# Patient Record
Sex: MALE | Race: WHITE | HISPANIC OR LATINO | Employment: UNEMPLOYED | URBAN - METROPOLITAN AREA
[De-identification: names, ages, dates, MRNs, and addresses within clinical notes are randomized per-mention and may not be internally consistent; named-entity substitution may affect disease eponyms.]

---

## 2018-10-22 ENCOUNTER — OFFICE VISIT (OUTPATIENT)
Dept: URGENT CARE | Facility: CLINIC | Age: 38
End: 2018-10-22

## 2018-10-22 ENCOUNTER — NON-PROVIDER VISIT (OUTPATIENT)
Dept: URGENT CARE | Facility: CLINIC | Age: 38
End: 2018-10-22

## 2018-10-22 DIAGNOSIS — Z01.89 RESPIRATORY CLEARANCE EXAMINATION, ENCOUNTER FOR: ICD-10-CM

## 2018-10-22 PROCEDURE — 94375 RESPIRATORY FLOW VOLUME LOOP: CPT | Performed by: NURSE PRACTITIONER

## 2018-10-22 NOTE — PROGRESS NOTES
Js Blanchard is a 38 y.o. male here for a non-provider visit for mask fit respiratroy clearance    If abnormal was an in office provider notified today (if so, indicate provider)? Yes  Routed to PCP? No

## 2020-11-04 ENCOUNTER — HOSPITAL ENCOUNTER (OUTPATIENT)
Facility: MEDICAL CENTER | Age: 40
End: 2020-11-04
Attending: PHYSICIAN ASSISTANT

## 2020-11-04 ENCOUNTER — OFFICE VISIT (OUTPATIENT)
Dept: URGENT CARE | Facility: PHYSICIAN GROUP | Age: 40
End: 2020-11-04

## 2020-11-04 VITALS
SYSTOLIC BLOOD PRESSURE: 126 MMHG | HEART RATE: 102 BPM | HEIGHT: 69 IN | WEIGHT: 243 LBS | TEMPERATURE: 98.4 F | RESPIRATION RATE: 16 BRPM | BODY MASS INDEX: 35.99 KG/M2 | DIASTOLIC BLOOD PRESSURE: 80 MMHG | OXYGEN SATURATION: 94 %

## 2020-11-04 DIAGNOSIS — J11.1 INFLUENZA-LIKE ILLNESS: ICD-10-CM

## 2020-11-04 PROCEDURE — 99214 OFFICE O/P EST MOD 30 MIN: CPT | Performed by: PHYSICIAN ASSISTANT

## 2020-11-04 PROCEDURE — U0003 INFECTIOUS AGENT DETECTION BY NUCLEIC ACID (DNA OR RNA); SEVERE ACUTE RESPIRATORY SYNDROME CORONAVIRUS 2 (SARS-COV-2) (CORONAVIRUS DISEASE [COVID-19]), AMPLIFIED PROBE TECHNIQUE, MAKING USE OF HIGH THROUGHPUT TECHNOLOGIES AS DESCRIBED BY CMS-2020-01-R: HCPCS

## 2020-11-04 RX ORDER — ALBUTEROL SULFATE 90 UG/1
2 AEROSOL, METERED RESPIRATORY (INHALATION) EVERY 6 HOURS PRN
Qty: 8.5 G | Refills: 0 | Status: SHIPPED | OUTPATIENT
Start: 2020-11-04 | End: 2021-06-14

## 2020-11-04 ASSESSMENT — ENCOUNTER SYMPTOMS
EYE PAIN: 0
PALPITATIONS: 0
FEVER: 1
ABDOMINAL PAIN: 0
HEARTBURN: 0
COUGH: 1
VOMITING: 0
STRIDOR: 0
SORE THROAT: 1
DIARRHEA: 0
NAUSEA: 1
HEADACHES: 0
MYALGIAS: 0
WHEEZING: 0
SENSORY CHANGE: 1
SPUTUM PRODUCTION: 0
DIZZINESS: 0
CHILLS: 0
SHORTNESS OF BREATH: 1
DIAPHORESIS: 0
SINUS PAIN: 0

## 2020-11-05 DIAGNOSIS — J11.1 INFLUENZA-LIKE ILLNESS: ICD-10-CM

## 2020-11-05 NOTE — PROGRESS NOTES
Subjective:   Js Blanchard is a 40 y.o. male who presents for Nausea (loss of taste/smell, fever, SOB, painful inhilation, x6 days )      HPI:  This is a very pleasant 40-year-old male presented to the clinic with fever, nausea, shortness of breath and loss of taste and smell x6 days.  Patient states his fever has gradually improved.  He no longer has any body aches.  He continues to have mild shortness of breath.  Occasional sharp pains with inspiration.  He denies any headaches or dizziness.  He denies any past medical history significant for asthma or COPD.  The patient is a non-smoker.  He is currently not taking any medications.  He has never had a DVT or PE.    Review of Systems   Constitutional: Positive for fever. Negative for chills, diaphoresis and malaise/fatigue.   HENT: Positive for sore throat. Negative for congestion, ear pain and sinus pain.    Eyes: Negative for pain.   Respiratory: Positive for cough and shortness of breath. Negative for sputum production, wheezing and stridor.    Cardiovascular: Negative for chest pain and palpitations.   Gastrointestinal: Positive for nausea. Negative for abdominal pain, diarrhea, heartburn and vomiting.   Musculoskeletal: Negative for myalgias.   Neurological: Positive for sensory change (Loss of taste and smell). Negative for dizziness and headaches.   Endo/Heme/Allergies: Negative for environmental allergies.       Medications:    • albuterol Aers    Allergies: Patient has no known allergies.    Problem List: Js Blanchard does not have a problem list on file.    Surgical History:  No past surgical history on file.    Past Social Hx: Js Blanchard  reports that he has never smoked. He has never used smokeless tobacco. He reports current alcohol use.     Past Family Hx:  Js Blanchard family history is not on file.     Problem list, medications, and allergies reviewed by myself today in Epic.     Objective:     /80 (BP Location: Right arm, Patient Position: Sitting,  "BP Cuff Size: Adult)   Pulse (!) 102   Temp 36.9 °C (98.4 °F) (Temporal)   Resp 16   Ht 1.753 m (5' 9\")   Wt 110.2 kg (243 lb)   SpO2 94%   BMI 35.88 kg/m²     Physical Exam  Constitutional:       General: He is not in acute distress.     Appearance: Normal appearance. He is not ill-appearing, toxic-appearing or diaphoretic.   HENT:      Head: Normocephalic and atraumatic.      Right Ear: Tympanic membrane, ear canal and external ear normal.      Left Ear: Tympanic membrane, ear canal and external ear normal.      Nose: Congestion present. No rhinorrhea.      Mouth/Throat:      Mouth: Mucous membranes are moist.      Pharynx: No oropharyngeal exudate or posterior oropharyngeal erythema.   Eyes:      Conjunctiva/sclera: Conjunctivae normal.   Neck:      Musculoskeletal: Normal range of motion. No neck rigidity or muscular tenderness.   Cardiovascular:      Rate and Rhythm: Normal rate and regular rhythm.      Pulses: Normal pulses.      Heart sounds: Normal heart sounds.   Pulmonary:      Effort: Pulmonary effort is normal.      Breath sounds: Normal breath sounds. No wheezing, rhonchi or rales.   Abdominal:      Palpations: Abdomen is soft.      Tenderness: There is no abdominal tenderness.   Musculoskeletal:      Right lower leg: No edema.      Left lower leg: No edema.   Lymphadenopathy:      Cervical: No cervical adenopathy.   Skin:     General: Skin is warm and dry.      Capillary Refill: Capillary refill takes less than 2 seconds.   Neurological:      General: No focal deficit present.      Mental Status: He is alert and oriented to person, place, and time. Mental status is at baseline.   Psychiatric:         Mood and Affect: Mood normal.         Thought Content: Thought content normal.           Assessment/Plan:     Diagnosis and associated orders:     1. Influenza-like illness  COVID/SARS COV-2 PCR    albuterol 108 (90 Base) MCG/ACT Aero Soln inhalation aerosol      Comments/MDM:       Recommended " strict isolation precautions.  Stay isolated until I reach out with final results.  Results may take 2 to 3 days.  Recommended supportive treatment including increase fluids and rest.  Use Tylenol and ibuprofen as needed for pain and fever.  Red flag symptoms were discussed with the patient at length today.  If any of these symptoms present return to the clinic or nearest emergency department.  Please call with any questions or concerns.  • Discussed other underlying possibilities including PE.  Discussed this work-up.  He would like to be tested for Covid at this time and then discuss further options if symptoms persist.               Differential diagnosis, natural history, supportive care, and indications for immediate follow-up discussed.    Advised the patient to follow-up with the primary care physician for recheck, reevaluation, and consideration of further management.    Please note that this dictation was created using voice recognition software. I have made reasonable attempt to correct obvious errors, but I expect that there are errors of grammar and possibly content that I did not discover before finalizing the note.    This note was electronically signed by SEGUNDO Borges PA-C

## 2020-11-06 LAB
COVID ORDER STATUS COVID19: NORMAL
SARS-COV-2 RNA RESP QL NAA+PROBE: DETECTED
SPECIMEN SOURCE: ABNORMAL

## 2020-11-07 ENCOUNTER — APPOINTMENT (OUTPATIENT)
Dept: RADIOLOGY | Facility: MEDICAL CENTER | Age: 40
End: 2020-11-07

## 2020-11-07 ENCOUNTER — HOSPITAL ENCOUNTER (EMERGENCY)
Facility: MEDICAL CENTER | Age: 40
End: 2020-11-07
Attending: EMERGENCY MEDICINE

## 2020-11-07 ENCOUNTER — APPOINTMENT (OUTPATIENT)
Dept: RADIOLOGY | Facility: MEDICAL CENTER | Age: 40
End: 2020-11-07
Attending: EMERGENCY MEDICINE

## 2020-11-07 VITALS
OXYGEN SATURATION: 100 % | TEMPERATURE: 97.8 F | DIASTOLIC BLOOD PRESSURE: 60 MMHG | HEART RATE: 80 BPM | SYSTOLIC BLOOD PRESSURE: 122 MMHG | RESPIRATION RATE: 16 BRPM | HEIGHT: 69 IN | BODY MASS INDEX: 36.29 KG/M2 | WEIGHT: 245 LBS

## 2020-11-07 DIAGNOSIS — U07.1 PNEUMONIA DUE TO COVID-19 VIRUS: ICD-10-CM

## 2020-11-07 DIAGNOSIS — J12.82 PNEUMONIA DUE TO COVID-19 VIRUS: ICD-10-CM

## 2020-11-07 LAB
ALBUMIN SERPL BCP-MCNC: 3.7 G/DL (ref 3.2–4.9)
ALBUMIN/GLOB SERPL: 1 G/DL
ALP SERPL-CCNC: 57 U/L (ref 30–99)
ALT SERPL-CCNC: 57 U/L (ref 2–50)
ANION GAP SERPL CALC-SCNC: 10 MMOL/L (ref 7–16)
APPEARANCE UR: CLEAR
AST SERPL-CCNC: 39 U/L (ref 12–45)
BACTERIA #/AREA URNS HPF: NEGATIVE /HPF
BASOPHILS # BLD AUTO: 0.2 % (ref 0–1.8)
BASOPHILS # BLD: 0.01 K/UL (ref 0–0.12)
BILIRUB SERPL-MCNC: 0.4 MG/DL (ref 0.1–1.5)
BILIRUB UR QL STRIP.AUTO: NEGATIVE
BUN SERPL-MCNC: 16 MG/DL (ref 8–22)
CALCIUM SERPL-MCNC: 8.9 MG/DL (ref 8.5–10.5)
CHLORIDE SERPL-SCNC: 99 MMOL/L (ref 96–112)
CO2 SERPL-SCNC: 26 MMOL/L (ref 20–33)
COLOR UR: YELLOW
CREAT SERPL-MCNC: 0.93 MG/DL (ref 0.5–1.4)
EOSINOPHIL # BLD AUTO: 0.01 K/UL (ref 0–0.51)
EOSINOPHIL NFR BLD: 0.2 % (ref 0–6.9)
EPI CELLS #/AREA URNS HPF: NEGATIVE /HPF
ERYTHROCYTE [DISTWIDTH] IN BLOOD BY AUTOMATED COUNT: 39.2 FL (ref 35.9–50)
GLOBULIN SER CALC-MCNC: 3.7 G/DL (ref 1.9–3.5)
GLUCOSE SERPL-MCNC: 235 MG/DL (ref 65–99)
GLUCOSE UR STRIP.AUTO-MCNC: >=1000 MG/DL
HCT VFR BLD AUTO: 52 % (ref 42–52)
HGB BLD-MCNC: 18 G/DL (ref 14–18)
HYALINE CASTS #/AREA URNS LPF: ABNORMAL /LPF
IMM GRANULOCYTES # BLD AUTO: 0.03 K/UL (ref 0–0.11)
IMM GRANULOCYTES NFR BLD AUTO: 0.5 % (ref 0–0.9)
KETONES UR STRIP.AUTO-MCNC: 15 MG/DL
LACTATE BLD-SCNC: 1.5 MMOL/L (ref 0.5–2)
LEUKOCYTE ESTERASE UR QL STRIP.AUTO: NEGATIVE
LYMPHOCYTES # BLD AUTO: 1.45 K/UL (ref 1–4.8)
LYMPHOCYTES NFR BLD: 25.3 % (ref 22–41)
MCH RBC QN AUTO: 31 PG (ref 27–33)
MCHC RBC AUTO-ENTMCNC: 34.6 G/DL (ref 33.7–35.3)
MCV RBC AUTO: 89.5 FL (ref 81.4–97.8)
MICRO URNS: ABNORMAL
MONOCYTES # BLD AUTO: 0.41 K/UL (ref 0–0.85)
MONOCYTES NFR BLD AUTO: 7.2 % (ref 0–13.4)
NEUTROPHILS # BLD AUTO: 3.81 K/UL (ref 1.82–7.42)
NEUTROPHILS NFR BLD: 66.6 % (ref 44–72)
NITRITE UR QL STRIP.AUTO: NEGATIVE
NRBC # BLD AUTO: 0 K/UL
NRBC BLD-RTO: 0 /100 WBC
PH UR STRIP.AUTO: 5 [PH] (ref 5–8)
PLATELET # BLD AUTO: 188 K/UL (ref 164–446)
PMV BLD AUTO: 11.2 FL (ref 9–12.9)
POTASSIUM SERPL-SCNC: 4.1 MMOL/L (ref 3.6–5.5)
PROT SERPL-MCNC: 7.4 G/DL (ref 6–8.2)
PROT UR QL STRIP: 100 MG/DL
RBC # BLD AUTO: 5.81 M/UL (ref 4.7–6.1)
RBC # URNS HPF: ABNORMAL /HPF
RBC UR QL AUTO: NEGATIVE
SODIUM SERPL-SCNC: 135 MMOL/L (ref 135–145)
SP GR UR REFRACTOMETRY: 1.04
UROBILINOGEN UR STRIP.AUTO-MCNC: 0.2 MG/DL
WBC # BLD AUTO: 5.7 K/UL (ref 4.8–10.8)
WBC #/AREA URNS HPF: ABNORMAL /HPF

## 2020-11-07 PROCEDURE — 99284 EMERGENCY DEPT VISIT MOD MDM: CPT

## 2020-11-07 PROCEDURE — 81001 URINALYSIS AUTO W/SCOPE: CPT

## 2020-11-07 PROCEDURE — 83605 ASSAY OF LACTIC ACID: CPT

## 2020-11-07 PROCEDURE — 87086 URINE CULTURE/COLONY COUNT: CPT

## 2020-11-07 PROCEDURE — 71045 X-RAY EXAM CHEST 1 VIEW: CPT

## 2020-11-07 PROCEDURE — 80053 COMPREHEN METABOLIC PANEL: CPT

## 2020-11-07 PROCEDURE — 87040 BLOOD CULTURE FOR BACTERIA: CPT | Mod: 91

## 2020-11-07 PROCEDURE — 85025 COMPLETE CBC W/AUTO DIFF WBC: CPT

## 2020-11-07 NOTE — ED TRIAGE NOTES
"Js Blanchard 40 y.o. male triaged in [facility] for     Chief Complaint   Patient presents with   • Cough   • Flank Pain   • Chills   • Fever     temp 101 at home   • Shortness of Breath     Pt reports positive covid test on Wednesday and has been trying to treat at home.  Pt reports he is feeling worse and came because he feels he has protein in his urine and flank pain.  Protocol orders placed.  Charge RN aware of patient.    /94   Pulse (!) 109   Temp 36.2 °C (97.1 °F) (Temporal)   Resp (!) 22   Ht 1.753 m (5' 9\")   Wt 111.1 kg (245 lb)   SpO2 92%   BMI 36.18 kg/m²     "

## 2020-11-07 NOTE — ED PROVIDER NOTES
"ED Provider Note    Scribed for Alec Estrada M.D. by Dannielle Oreilly. 11/7/2020  3:46 PM    Primary care provider: Pcp Pt States None  Means of arrival: Walk-in  History obtained from: Patient  History limited by: None    CHIEF COMPLAINT  Chief Complaint   Patient presents with   • Cough   • Flank Pain   • Chills   • Fever     temp 101 at home   • Shortness of Breath     HPI  Js Blanchard is a 40 y.o. male who presents to the Emergency Department for evaluation of cough, chills, fever, and shortness of breath for the past week. The patient reports positive COVID test 3 days ago and has been self-isolating at home. The patient has had new onset body aches and sediments in his urine which prompted him to come to the ED today. He denies any associated diarrhea.    REVIEW OF SYSTEMS  Pertinent positives include cough, chills, fever, shortness of breath, body aches, and sediments in urine . Pertinent negatives include no diarrhea.  All other systems reviewed and negative.    PAST MEDICAL HISTORY   None noted     SURGICAL HISTORY  patient denies any surgical history    SOCIAL HISTORY  Social History     Tobacco Use   • Smoking status: Never Smoker   • Smokeless tobacco: Never Used   Substance Use Topics   • Alcohol use: Yes   • Drug use: None noted       Social History     Substance and Sexual Activity   Drug Use None noted      FAMILY HISTORY  None noted     CURRENT MEDICATIONS  Home Medications    **Home medications have not yet been reviewed for this encounter**       ALLERGIES  No Known Allergies    PHYSICAL EXAM  VITAL SIGNS: /94   Pulse (!) 109   Temp 36.2 °C (97.1 °F) (Temporal)   Resp (!) 22   Ht 1.753 m (5' 9\")   Wt 111.1 kg (245 lb)   SpO2 92%   BMI 36.18 kg/m²     Vital signs reviewed.  Constitutional:  Well appearing.   Head: Normocephalic.   Mouth/Throat: Oropharynx is clear and moist.   Eyes: EOM are normal. Pupils are equal, round, and reactive to light.   Neck: Normal range of motion. " Neck supple.   Cardiovascular: Normal rate, regular rhythm and normal heart sounds.    Pulmonary/Chest: Effort normal and breath sounds normal. No wheezes.   Abdominal: Soft. There is no tenderness. There is no rebound and no guarding.   Musculoskeletal: Exhibits no edema.   Lymphadenopathy: No cervical adenopathy.   Neurological: Patient is alert and oriented to person, place, and time. CNs II - XII intact. DTRs intact. Normal sensation and strength.  Skin: Skin is warm and dry.   Psychiatric: Patient has a normal mood and affect. Behavior is normal.     LABS  Results for orders placed or performed during the hospital encounter of 11/07/20   Lactic acid (lactate)   Result Value Ref Range    Lactic Acid 1.5 0.5 - 2.0 mmol/L   CBC WITH DIFFERENTIAL   Result Value Ref Range    WBC 5.7 4.8 - 10.8 K/uL    RBC 5.81 4.70 - 6.10 M/uL    Hemoglobin 18.0 14.0 - 18.0 g/dL    Hematocrit 52.0 42.0 - 52.0 %    MCV 89.5 81.4 - 97.8 fL    MCH 31.0 27.0 - 33.0 pg    MCHC 34.6 33.7 - 35.3 g/dL    RDW 39.2 35.9 - 50.0 fL    Platelet Count 188 164 - 446 K/uL    MPV 11.2 9.0 - 12.9 fL    Neutrophils-Polys 66.60 44.00 - 72.00 %    Lymphocytes 25.30 22.00 - 41.00 %    Monocytes 7.20 0.00 - 13.40 %    Eosinophils 0.20 0.00 - 6.90 %    Basophils 0.20 0.00 - 1.80 %    Immature Granulocytes 0.50 0.00 - 0.90 %    Nucleated RBC 0.00 /100 WBC    Neutrophils (Absolute) 3.81 1.82 - 7.42 K/uL    Lymphs (Absolute) 1.45 1.00 - 4.80 K/uL    Monos (Absolute) 0.41 0.00 - 0.85 K/uL    Eos (Absolute) 0.01 0.00 - 0.51 K/uL    Baso (Absolute) 0.01 0.00 - 0.12 K/uL    Immature Granulocytes (abs) 0.03 0.00 - 0.11 K/uL    NRBC (Absolute) 0.00 K/uL   COMP METABOLIC PANEL   Result Value Ref Range    Sodium 135 135 - 145 mmol/L    Potassium 4.1 3.6 - 5.5 mmol/L    Chloride 99 96 - 112 mmol/L    Co2 26 20 - 33 mmol/L    Anion Gap 10.0 7.0 - 16.0    Glucose 235 (H) 65 - 99 mg/dL    Bun 16 8 - 22 mg/dL    Creatinine 0.93 0.50 - 1.40 mg/dL    Calcium 8.9 8.5 - 10.5  mg/dL    AST(SGOT) 39 12 - 45 U/L    ALT(SGPT) 57 (H) 2 - 50 U/L    Alkaline Phosphatase 57 30 - 99 U/L    Total Bilirubin 0.4 0.1 - 1.5 mg/dL    Albumin 3.7 3.2 - 4.9 g/dL    Total Protein 7.4 6.0 - 8.2 g/dL    Globulin 3.7 (H) 1.9 - 3.5 g/dL    A-G Ratio 1.0 g/dL   URINALYSIS    Specimen: Urine   Result Value Ref Range    Color Yellow     Character Clear     Ph 5.0 5.0 - 8.0    Glucose >=1000 (A) Negative mg/dL    Ketones 15 (A) Negative mg/dL    Protein 100 (A) Negative mg/dL    Bilirubin Negative Negative    Urobilinogen, Urine 0.2 Negative    Nitrite Negative Negative    Leukocyte Esterase Negative Negative    Occult Blood Negative Negative    Micro Urine Req Microscopic    ESTIMATED GFR   Result Value Ref Range    GFR If African American >60 >60 mL/min/1.73 m 2    GFR If Non African American >60 >60 mL/min/1.73 m 2   REFRACTOMETER SG   Result Value Ref Range    Specific Gravity 1.040    URINE MICROSCOPIC (W/UA)   Result Value Ref Range    WBC 0-2 (A) /hpf    RBC 0-2 (A) /hpf    Bacteria Negative None /hpf    Epithelial Cells Negative /hpf    Hyaline Cast 0-2 /lpf     All labs reviewed by me.    RADIOLOGY  DX-CHEST-PORTABLE (1 VIEW)   Final Result         Diffuse interstitial prominence with hazy peripheral opacities, in keeping with Covid 19 pneumonia.        The radiologist's interpretation of all radiological studies have been reviewed by me.    COURSE & MEDICAL DECISION MAKING  Pertinent Labs & Imaging studies reviewed. (See chart for details) The patient's [facility] and past medical  records were reviewed    3:46 PM - Patient seen and examined at bedside. Ordered DX-Chest, blood culture, urine culture, UA, CMP, CC with differential, lactic acid, and estimated GFR to evaluate his symptoms. The differential diagnoses include but are not limited to: COVID, pneumonia, sepsis    5:02 PM - I updated on the patient's lab and imaging results. Discussed plans for discharge at this time. I discussed plan of  discharge and strict return precautions for any new or worsening symptoms, including isolation at home. The patient verbalizes agreement and understands.    The patient will return for new or worsening symptoms and is stable at the time of discharge.    The patient is referred to a primary physician for blood pressure management, diabetic screening, and for all other preventative health concerns.    DISPOSITION:  Patient will be discharged home in stable condition.    FOLLOW UP:  [facility], Emergency Dept  [address] [address] [phone]  In 2 days      OUTPATIENT MEDICATIONS:  Discharge Medication List as of 11/7/2020  5:05 PM        FINAL IMPRESSION  1. Pneumonia due to COVID-19 virus          IDannielle (Pattiibe), am scribing for, and in the presence of, Alec Estrada M.D..    Electronically signed by: Dannielle Oreilly ([place]), 11/7/2020    IAlec M.D. personally performed the services described in this documentation, as scribed by Dannielle Oreilly in my presence, and it is both accurate and complete.    C.     The note accurately reflects work and decisions made by me.  Alec Estrada M.D.  11/7/2020  9:00 PM

## 2020-11-10 LAB
BACTERIA UR CULT: NORMAL
SIGNIFICANT IND 70042: NORMAL
SITE SITE: NORMAL
SOURCE SOURCE: NORMAL

## 2020-11-12 LAB
BACTERIA BLD CULT: NORMAL
BACTERIA BLD CULT: NORMAL
SIGNIFICANT IND 70042: NORMAL
SIGNIFICANT IND 70042: NORMAL
SITE SITE: NORMAL
SITE SITE: NORMAL
SOURCE SOURCE: NORMAL
SOURCE SOURCE: NORMAL

## 2021-05-03 ENCOUNTER — TELEPHONE (OUTPATIENT)
Dept: SCHEDULING | Facility: IMAGING CENTER | Age: 41
End: 2021-05-03

## 2021-06-14 ENCOUNTER — OFFICE VISIT (OUTPATIENT)
Dept: MEDICAL GROUP | Facility: MEDICAL CENTER | Age: 41
End: 2021-06-14
Attending: PHYSICIAN ASSISTANT
Payer: MEDICAID

## 2021-06-14 VITALS
OXYGEN SATURATION: 93 % | HEIGHT: 69 IN | RESPIRATION RATE: 16 BRPM | HEART RATE: 95 BPM | WEIGHT: 245.2 LBS | DIASTOLIC BLOOD PRESSURE: 90 MMHG | SYSTOLIC BLOOD PRESSURE: 142 MMHG | TEMPERATURE: 99 F | BODY MASS INDEX: 36.32 KG/M2

## 2021-06-14 DIAGNOSIS — Z86.16 HISTORY OF COVID-19: ICD-10-CM

## 2021-06-14 DIAGNOSIS — R40.0 DAYTIME SOMNOLENCE: ICD-10-CM

## 2021-06-14 DIAGNOSIS — R06.81 APNEIC EPISODE: ICD-10-CM

## 2021-06-14 DIAGNOSIS — R82.998 FOAMY URINE: ICD-10-CM

## 2021-06-14 DIAGNOSIS — R03.0 ELEVATED BLOOD PRESSURE READING: ICD-10-CM

## 2021-06-14 DIAGNOSIS — Z00.00 HEALTH CARE MAINTENANCE: ICD-10-CM

## 2021-06-14 PROCEDURE — 99203 OFFICE O/P NEW LOW 30 MIN: CPT | Performed by: PHYSICIAN ASSISTANT

## 2021-06-14 PROCEDURE — 99204 OFFICE O/P NEW MOD 45 MIN: CPT | Performed by: PHYSICIAN ASSISTANT

## 2021-06-14 PROCEDURE — 99213 OFFICE O/P EST LOW 20 MIN: CPT | Performed by: PHYSICIAN ASSISTANT

## 2021-06-14 RX ORDER — BLOOD PRESSURE TEST KIT
1 KIT MISCELLANEOUS ONCE
Qty: 1 KIT | Refills: 0 | Status: SHIPPED
Start: 2021-06-14 | End: 2021-06-14

## 2021-06-14 ASSESSMENT — PATIENT HEALTH QUESTIONNAIRE - PHQ9: CLINICAL INTERPRETATION OF PHQ2 SCORE: 0

## 2021-06-14 ASSESSMENT — FIBROSIS 4 INDEX: FIB4 SCORE: 1.18

## 2021-06-14 NOTE — ASSESSMENT & PLAN NOTE
Js presents today to establish care.  He reports that he had COVID-19 pneumonia back in November 2020.  He reports that since then he has had residual chest pain and shortness of breath.  He states that this has gotten better over time but has been noticeable.  He also reports lower back pain in which she is concerned for kidney issues.  Last labs were completed back in November.  He has not had any follow-up health maintenance labs completed.  No red flag signs on exam.  Patient appears well.

## 2021-06-14 NOTE — ASSESSMENT & PLAN NOTE
"She presents today to establish care.  He reports that shortly after recovering from COVID-19 infection back in November 2020 he started noticing \"foamy urine\".  He also reports associated symptoms of polyuria, polydipsia and delayed wound healing.  He reports a significant family history of type 2 diabetes in his mother and brother.  He would like to be screened for this.  "

## 2021-06-14 NOTE — ASSESSMENT & PLAN NOTE
"Js presents today to establish care.  He reports daytime symptoms of EDS or fatigue, non-restorative sleep upon waking and sore/dry throat. Nighttime symptoms: loud snoring, snort/gasp that arouses pt from sleep but not usually to full consciousness. Disrupted sleep and witnessed apneic episodes at night.  He also reports a feeling of his \"heart racing\" in the mornings upon waking.  He is not currently on any medications.  Denies taking OTC sedatives.  Risk factors: Obesity, >41 yo and alcohol intake before bedtime.       "

## 2021-06-14 NOTE — PROGRESS NOTES
"Chief Complaint   Patient presents with   • Establish Care     Subjective:     HPI:   Js Blanchard is a 41 y.o. male here to establish care and to discuss the evaluation and management of:    Foamy urine  Js presents today to establish care.  He reports that shortly after recovering from COVID-19 infection back in November 2020 he started noticing \"foamy urine\".  He also reports associated symptoms of polyuria, polydipsia and delayed wound healing.  He reports a significant family history of type 2 diabetes in his mother and brother.  He would like to be screened for this.    Apneic episode  Js presents today to establish care.  He reports daytime symptoms of EDS or fatigue, non-restorative sleep upon waking and sore/dry throat. Nighttime symptoms: loud snoring, snort/gasp that arouses pt from sleep but not usually to full consciousness. Disrupted sleep and witnessed apneic episodes at night.  He also reports a feeling of his \"heart racing\" in the mornings upon waking.  He is not currently on any medications.  Denies taking OTC sedatives.  Risk factors: Obesity, >39 yo and alcohol intake before bedtime.     History of COVID-19  Js presents today to establish care.  He reports that he had COVID-19 pneumonia back in November 2020.  He reports that since then he has had residual chest pain and shortness of breath.  He states that this has gotten better over time but has been noticeable.  He also reports lower back pain in which she is concerned for kidney issues.  Last labs were completed back in November.  He has not had any follow-up health maintenance labs completed.  No red flag signs on exam.  Patient appears well.    ROS  See HPI.     No Known Allergies    Current medicines (including changes today)  Current Outpatient Medications   Medication Sig Dispense Refill   • Blood Pressure Kit 1 Each one time for 1 dose. 1 Kit 0     No current facility-administered medications for this visit.     He  has a past " "medical history of Hyperlipidemia and Hypertension.  He  has no past surgical history on file.  Social History     Tobacco Use   • Smoking status: Never Smoker   • Smokeless tobacco: Never Used   Vaping Use   • Vaping Use: Never used   Substance Use Topics   • Alcohol use: Yes     Comment: 2-3 drinks a week.   • Drug use: Never       Family History   Problem Relation Age of Onset   • Diabetes Mother    • Hypertension Mother    • Hyperlipidemia Mother    • Diabetes Brother    • Hypertension Brother    • Hyperlipidemia Brother    • Lung Disease Neg Hx    • Cancer Neg Hx    • Heart Disease Neg Hx      No family status information on file.       Patient Active Problem List    Diagnosis Date Noted   • Foamy urine 06/14/2021   • Elevated blood pressure reading 06/14/2021   • Apneic episode 06/14/2021   • History of COVID-19 06/14/2021        Objective:     /90 (BP Location: Left arm, Patient Position: Sitting, BP Cuff Size: Adult)   Pulse 95   Temp 37.2 °C (99 °F) (Temporal)   Resp 16   Ht 1.753 m (5' 9\")   Wt 111 kg (245 lb 3.2 oz)   SpO2 93%  Body mass index is 36.21 kg/m².    Physical Exam:  Physical Exam  Vitals reviewed.   Constitutional:       Appearance: Normal appearance.   HENT:      Head: Normocephalic.      Right Ear: External ear normal.      Left Ear: External ear normal.      Mouth/Throat:      Lips: Pink.      Mouth: Mucous membranes are moist.      Pharynx: Oropharynx is clear. Uvula midline.   Eyes:      Pupils: Pupils are equal, round, and reactive to light.   Neck:      Thyroid: No thyromegaly.   Cardiovascular:      Rate and Rhythm: Normal rate and regular rhythm.      Heart sounds: Normal heart sounds.   Pulmonary:      Effort: Pulmonary effort is normal.      Breath sounds: Normal breath sounds.   Musculoskeletal:      Cervical back: Normal range of motion.   Lymphadenopathy:      Cervical: No cervical adenopathy.      Upper Body:      Right upper body: No supraclavicular adenopathy.      " Left upper body: No supraclavicular adenopathy.   Skin:     General: Skin is warm and dry.   Neurological:      Mental Status: He is alert and oriented to person, place, and time.      Gait: Gait is intact.   Psychiatric:         Mood and Affect: Affect normal.         Judgment: Judgment normal.       Assessment and Plan:     The following treatment plan was discussed:    1. Foamy urine  - This is a chronic condition.  - Plan: Screen for diabetes.  - HEMOGLOBIN A1C; Future  - URINALYSIS,CULTURE IF INDICATED; Future  - MICROALBUMIN CREAT RATIO URINE; Future    2. Apneic episode, Daytime somnolence  - This is a chronic condition.  - Plan: REFERRAL TO SLEEP MEDICINE placed to evaluate for diagnosis of TIFFANIE.    3. History of COVID-19  - Plan: Obtain basic lab panel.  Follow-up after completion of labs.    4. Elevated blood pressure reading  - This is a new condition.  - Plan: Start monitoring blood pressures at home.  Instructed to check 3 days out of the week 1 reading in a.m. and 1 reading in the p.m.  Write these numbers down in a log and bring this to follow-up visit in 4 weeks for reevaluation.  - Blood Pressure Kit; 1 Each one time for 1 dose.  Dispense: 1 Kit; Refill: 0    5. Health care maintenance  - Js is due for his yearly health maintenance labs.  - CBC WITHOUT DIFFERENTIAL; Future  - Comp Metabolic Panel; Future  - Lipid Profile; Future  - TSH WITH REFLEX TO FT4; Future    Any change or worsening of signs or symptoms, patient encouraged to follow-up or report to emergency room for further evaluation. Patient verbalizes understanding and agrees.    Follow-Up: Return in about 4 weeks (around 7/12/2021).      PLEASE NOTE: This dictation was created using voice recognition software. I have made every reasonable attempt to correct obvious errors, but I expect that there are errors of grammar and possibly content that I did not discover before finalizing the note.

## 2021-06-18 ENCOUNTER — HOSPITAL ENCOUNTER (OUTPATIENT)
Dept: LAB | Facility: MEDICAL CENTER | Age: 41
End: 2021-06-18
Attending: PHYSICIAN ASSISTANT
Payer: MEDICAID

## 2021-06-18 DIAGNOSIS — R82.998 FOAMY URINE: ICD-10-CM

## 2021-06-18 DIAGNOSIS — Z00.00 HEALTH CARE MAINTENANCE: ICD-10-CM

## 2021-06-18 LAB
ALBUMIN SERPL BCP-MCNC: 4.6 G/DL (ref 3.2–4.9)
ALBUMIN/GLOB SERPL: 1.4 G/DL
ALP SERPL-CCNC: 76 U/L (ref 30–99)
ALT SERPL-CCNC: 76 U/L (ref 2–50)
ANION GAP SERPL CALC-SCNC: 18 MMOL/L (ref 7–16)
APPEARANCE UR: CLEAR
AST SERPL-CCNC: 38 U/L (ref 12–45)
BACTERIA #/AREA URNS HPF: NEGATIVE /HPF
BILIRUB SERPL-MCNC: 0.7 MG/DL (ref 0.1–1.5)
BILIRUB UR QL STRIP.AUTO: NEGATIVE
BUN SERPL-MCNC: 21 MG/DL (ref 8–22)
CALCIUM SERPL-MCNC: 9.5 MG/DL (ref 8.5–10.5)
CHLORIDE SERPL-SCNC: 101 MMOL/L (ref 96–112)
CHOLEST SERPL-MCNC: 275 MG/DL (ref 100–199)
CO2 SERPL-SCNC: 18 MMOL/L (ref 20–33)
COLOR UR: YELLOW
CREAT SERPL-MCNC: 1.01 MG/DL (ref 0.5–1.4)
CREAT UR-MCNC: 83.12 MG/DL
EPI CELLS #/AREA URNS HPF: NEGATIVE /HPF
ERYTHROCYTE [DISTWIDTH] IN BLOOD BY AUTOMATED COUNT: 39.5 FL (ref 35.9–50)
EST. AVERAGE GLUCOSE BLD GHB EST-MCNC: 309 MG/DL
FASTING STATUS PATIENT QL REPORTED: NORMAL
GLOBULIN SER CALC-MCNC: 3.4 G/DL (ref 1.9–3.5)
GLUCOSE SERPL-MCNC: 367 MG/DL (ref 65–99)
GLUCOSE UR STRIP.AUTO-MCNC: >=1000 MG/DL
HBA1C MFR BLD: 12.4 % (ref 4–5.6)
HCT VFR BLD AUTO: 52.9 % (ref 42–52)
HDLC SERPL-MCNC: 27 MG/DL
HGB BLD-MCNC: 18.9 G/DL (ref 14–18)
HYALINE CASTS #/AREA URNS LPF: ABNORMAL /LPF
KETONES UR STRIP.AUTO-MCNC: 40 MG/DL
LDLC SERPL CALC-MCNC: ABNORMAL MG/DL
LEUKOCYTE ESTERASE UR QL STRIP.AUTO: NEGATIVE
MCH RBC QN AUTO: 31 PG (ref 27–33)
MCHC RBC AUTO-ENTMCNC: 35.7 G/DL (ref 33.7–35.3)
MCV RBC AUTO: 86.9 FL (ref 81.4–97.8)
MICRO URNS: ABNORMAL
MICROALBUMIN UR-MCNC: 10.5 MG/DL
MICROALBUMIN/CREAT UR: 126 MG/G (ref 0–30)
NITRITE UR QL STRIP.AUTO: NEGATIVE
PH UR STRIP.AUTO: 5.5 [PH] (ref 5–8)
PLATELET # BLD AUTO: 220 K/UL (ref 164–446)
PMV BLD AUTO: 11.8 FL (ref 9–12.9)
POTASSIUM SERPL-SCNC: 4.5 MMOL/L (ref 3.6–5.5)
PROT SERPL-MCNC: 8 G/DL (ref 6–8.2)
PROT UR QL STRIP: 30 MG/DL
RBC # BLD AUTO: 6.09 M/UL (ref 4.7–6.1)
RBC # URNS HPF: ABNORMAL /HPF
RBC UR QL AUTO: NEGATIVE
SODIUM SERPL-SCNC: 137 MMOL/L (ref 135–145)
SP GR UR STRIP.AUTO: 1.04
TRIGL SERPL-MCNC: 671 MG/DL (ref 0–149)
TSH SERPL DL<=0.005 MIU/L-ACNC: 0.44 UIU/ML (ref 0.38–5.33)
UROBILINOGEN UR STRIP.AUTO-MCNC: 0.2 MG/DL
WBC # BLD AUTO: 8.6 K/UL (ref 4.8–10.8)
WBC #/AREA URNS HPF: ABNORMAL /HPF

## 2021-06-18 PROCEDURE — 83036 HEMOGLOBIN GLYCOSYLATED A1C: CPT

## 2021-06-18 PROCEDURE — 36415 COLL VENOUS BLD VENIPUNCTURE: CPT

## 2021-06-18 PROCEDURE — 80061 LIPID PANEL: CPT

## 2021-06-18 PROCEDURE — 82043 UR ALBUMIN QUANTITATIVE: CPT

## 2021-06-18 PROCEDURE — 82570 ASSAY OF URINE CREATININE: CPT

## 2021-06-18 PROCEDURE — 81001 URINALYSIS AUTO W/SCOPE: CPT

## 2021-06-18 PROCEDURE — 80053 COMPREHEN METABOLIC PANEL: CPT

## 2021-06-18 PROCEDURE — 84443 ASSAY THYROID STIM HORMONE: CPT

## 2021-06-18 PROCEDURE — 85027 COMPLETE CBC AUTOMATED: CPT

## 2021-07-20 ENCOUNTER — TELEPHONE (OUTPATIENT)
Dept: MEDICAL GROUP | Facility: MEDICAL CENTER | Age: 41
End: 2021-07-20

## 2021-08-19 ENCOUNTER — TELEMEDICINE (OUTPATIENT)
Dept: MEDICAL GROUP | Facility: MEDICAL CENTER | Age: 41
End: 2021-08-19
Attending: INTERNAL MEDICINE
Payer: MEDICAID

## 2021-08-19 VITALS — WEIGHT: 245 LBS | HEIGHT: 69 IN | BODY MASS INDEX: 36.29 KG/M2 | RESPIRATION RATE: 16 BRPM

## 2021-08-19 DIAGNOSIS — R80.9 MICROALBUMINURIA DUE TO TYPE 2 DIABETES MELLITUS (HCC): ICD-10-CM

## 2021-08-19 DIAGNOSIS — R03.0 ELEVATED BLOOD PRESSURE READING: ICD-10-CM

## 2021-08-19 DIAGNOSIS — D75.1 POLYCYTHEMIA: ICD-10-CM

## 2021-08-19 DIAGNOSIS — E11.29 MICROALBUMINURIA DUE TO TYPE 2 DIABETES MELLITUS (HCC): ICD-10-CM

## 2021-08-19 DIAGNOSIS — E11.69 HYPERLIPIDEMIA DUE TO TYPE 2 DIABETES MELLITUS (HCC): ICD-10-CM

## 2021-08-19 DIAGNOSIS — E11.9 TYPE 2 DIABETES MELLITUS WITHOUT COMPLICATION, WITHOUT LONG-TERM CURRENT USE OF INSULIN (HCC): ICD-10-CM

## 2021-08-19 DIAGNOSIS — E78.5 HYPERLIPIDEMIA DUE TO TYPE 2 DIABETES MELLITUS (HCC): ICD-10-CM

## 2021-08-19 PROBLEM — R82.998 FOAMY URINE: Status: RESOLVED | Noted: 2021-06-14 | Resolved: 2021-08-19

## 2021-08-19 PROCEDURE — 99214 OFFICE O/P EST MOD 30 MIN: CPT | Performed by: INTERNAL MEDICINE

## 2021-08-19 RX ORDER — ATORVASTATIN CALCIUM 40 MG/1
40 TABLET, FILM COATED ORAL DAILY
Qty: 30 TABLET | Refills: 3 | Status: SHIPPED | OUTPATIENT
Start: 2021-08-19 | End: 2021-08-23 | Stop reason: SDUPTHER

## 2021-08-19 RX ORDER — EMPAGLIFLOZIN 25 MG/1
1 TABLET, FILM COATED ORAL DAILY
Qty: 30 TABLET | Refills: 3 | Status: SHIPPED | OUTPATIENT
Start: 2021-08-19 | End: 2021-08-23 | Stop reason: SDUPTHER

## 2021-08-19 ASSESSMENT — FIBROSIS 4 INDEX: FIB4 SCORE: 0.85

## 2021-08-20 NOTE — ASSESSMENT & PLAN NOTE
This is a new diagnosis for patient.  He recently had labs done in June which showed an A1c of 12.4.  He states that prior to this he was not feeling well and he suspected he might have diabetes which is why he initially established care and requested labs.  He has difficulty recounting his diet to me.  He states he has been under a lot of stress lately and has not been eating well since he lost his job.  He has never taken any medication for diabetes in the past.

## 2021-08-20 NOTE — ASSESSMENT & PLAN NOTE
The 10-year ASCVD risk score (Jennifer BENITO Jr., et al., 2013) is: 14.2%  He is not currently on any medication for lipid lowering.

## 2021-08-20 NOTE — ASSESSMENT & PLAN NOTE
Hemoglobin on most recent labs is 18.9.  He denies any history of COPD.  He does report feeling dehydrated lately.  Is not using exogenous testosterone.

## 2021-08-20 NOTE — PROGRESS NOTES
Virtual Visit: Established Patient   This visit was conducted via Zoom using secure and encrypted videoconferencing technology.   The patient was in a private location in the [facility].    The patient's identity was confirmed and verbal consent was obtained for this virtual visit.    Subjective:   CC:   Chief Complaint   Patient presents with   • Follow-Up labs       Js Blanchard is a 41 y.o. male presenting for evaluation and management of:    Elevated blood pressure reading  He has not been able to obtain a blood pressure cuff.  At his last visit his blood pressure was mildly elevated 140/90.  He has never been on any medication for blood pressure.  He plans to purchase cuff and start home monitoring.    Polycythemia  Hemoglobin on most recent labs is 18.9.  He denies any history of COPD.  He does report feeling dehydrated lately.  Is not using exogenous testosterone.    Microalbuminuria due to type 2 diabetes mellitus (HCC)  Most recent labs show mild microalbuminuria.    Hyperlipidemia due to type 2 diabetes mellitus (HCC)  The 10-year ASCVD risk score ([place] BENITO Jr., et al., 2013) is: 14.2%  He is not currently on any medication for lipid lowering.    Type 2 diabetes mellitus without complication, without long-term current use of insulin (HCC)  This is a new diagnosis for patient.  He recently had labs done in June which showed an A1c of 12.4.  He states that prior to this he was not feeling well and he suspected he might have diabetes which is why he initially established care and requested labs.  He has difficulty recounting his diet to me.  He states he has been under a lot of stress lately and has not been eating well since he lost his job.  He has never taken any medication for diabetes in the past.    ROS   + polyuria/polydypsia  +fatigue    Current medicines (including changes today)  Current Outpatient Medications   Medication Sig Dispense Refill   • metformin (GLUCOPHAGE) 1000 MG tablet Take 1 tab  "by mouth daily for 1 week then increase to 1 tab BID 60 Tablet 3   • atorvastatin (LIPITOR) 40 MG Tab Take 1 Tablet by mouth every day. 30 Tablet 3   • Empagliflozin (JARDIANCE) 25 MG Tab Take 1 Tablet by mouth every day. 30 Tablet 3     No current facility-administered medications for this visit.       Patient Active Problem List    Diagnosis Date Noted   • Type 2 diabetes mellitus without complication, without long-term current use of insulin ([facility]) 08/19/2021   • Hyperlipidemia due to type 2 diabetes mellitus ([facility]) 08/19/2021   • Microalbuminuria due to type 2 diabetes mellitus ([facility]) 08/19/2021   • Polycythemia 08/19/2021   • Elevated blood pressure reading 06/14/2021   • Apneic episode 06/14/2021   • History of COVID-19 06/14/2021        Objective:   Resp 16   Ht 1.753 m (5' 9\")   Wt 111 kg (245 lb)   BMI 36.18 kg/m²     Physical Exam:  Constitutional: Alert, no distress, well-groomed.  Skin: No rashes in visible areas.  Eye: Round. Conjunctiva clear, lids normal. No icterus.   ENMT: Lips pink without lesions, good dentition, moist mucous membranes. Phonation normal.  Neck: No masses, no thyromegaly. Moves freely without pain.  Respiratory: Unlabored respiratory effort, no cough or audible wheeze  Psych: Alert and oriented x3, normal affect and mood.     Results:    Lab Results   Component Value Date/Time    CHOLSTRLTOT 275 (H) 06/18/2021 10:06 AM    LDL see below 06/18/2021 10:06 AM    HDL 27 (A) 06/18/2021 10:06 AM    TRIGLYCERIDE 671 (H) 06/18/2021 10:06 AM       Lab Results   Component Value Date/Time    SODIUM 137 06/18/2021 10:06 AM    POTASSIUM 4.5 06/18/2021 10:06 AM    CHLORIDE 101 06/18/2021 10:06 AM    CO2 18 (L) 06/18/2021 10:06 AM    GLUCOSE 367 (H) 06/18/2021 10:06 AM    BUN 21 06/18/2021 10:06 AM    CREATININE 1.01 06/18/2021 10:06 AM     Lab Results   Component Value Date/Time    ALKPHOSPHAT 76 06/18/2021 10:06 AM    ASTSGOT 38 06/18/2021 10:06 AM    ALTSGPT 76 (H) 06/18/2021 10:06 AM    " TBILIRUBIN 0.7 06/18/2021 10:06 AM      Lab Results   Component Value Date/Time    WBC 8.6 06/18/2021 10:06 AM    RBC 6.09 06/18/2021 10:06 AM    HEMOGLOBIN 18.9 (H) 06/18/2021 10:06 AM    HEMATOCRIT 52.9 (H) 06/18/2021 10:06 AM    MCV 86.9 06/18/2021 10:06 AM    MCH 31.0 06/18/2021 10:06 AM    MCHC 35.7 (H) 06/18/2021 10:06 AM    MPV 11.8 06/18/2021 10:06 AM    NEUTSPOLYS 66.60 11/07/2020 03:55 PM    LYMPHOCYTES 25.30 11/07/2020 03:55 PM    MONOCYTES 7.20 11/07/2020 03:55 PM    EOSINOPHILS 0.20 11/07/2020 03:55 PM    BASOPHILS 0.20 11/07/2020 03:55 PM      Results for PARAM BROWN (MRN [id number]) as of 8/19/2021 17:41   Ref. Range 6/18/2021 10:06   Glycohemoglobin Latest Ref Range: 4.0 - 5.6 % 12.4 (H)   Estim. Avg Glu Latest Units: mg/dL 309     Results for PARAM BROWN (MRN [id number]) as of 8/19/2021 17:41   Ref. Range 6/18/2021 10:06   Micro Alb Creat Ratio Latest Ref Range: 0 - 30 mg/g 126 (H)   Creatinine, Urine Latest Units: mg/dL 83.12   Microalbumin, Urine Random Latest Units: mg/dL 10.5   Urobilinogen, Urine Latest Ref Range: Negative  0.2   Color Unknown Yellow   Character Unknown Clear   Specific Gravity Latest Ref Range: <1.035  1.044   Ph Latest Ref Range: 5.0 - 8.0  5.5   Glucose Latest Ref Range: Negative mg/dL >=1000 (A)   Ketones Latest Ref Range: Negative mg/dL 40 (A)   Bilirubin Latest Ref Range: Negative  Negative   Occult Blood Latest Ref Range: Negative  Negative   Protein Latest Ref Range: Negative mg/dL 30 (A)   Nitrite Latest Ref Range: Negative  Negative   Leukocyte Esterase Latest Ref Range: Negative  Negative   Micro Urine Req Unknown Microscopic   WBC Latest Units: /hpf 0-2 (A)   RBC Latest Units: /hpf 0-2 (A)   Epithelial Cells Latest Units: /hpf Negative   Bacteria Latest Ref Range: None /hpf Negative   Hyaline Cast Latest Units: /lpf 0-2   TSH Latest Ref Range: 0.380 - 5.330 uIU/mL 0.440     Assessment and Plan:   The following treatment plan was discussed:     1. Type 2 diabetes mellitus  without complication, without long-term current use of insulin (HCC)  Uncontrolled, new diagnosis for patient.  Will start on Jardiance and Metformin as below.  Encouraged him to follow-up in clinic as soon as he is back in [place] so that we can repeat A1c.  Discussed briefly follow-up with our diabetic pharmacy team.  Discussed diabetic diet and importance of weight loss.  - metformin (GLUCOPHAGE) 1000 MG tablet; Take 1 tab by mouth daily for 1 week then increase to 1 tab BID  Dispense: 60 Tablet; Refill: 3  - atorvastatin (LIPITOR) 40 MG Tab; Take 1 Tablet by mouth every day.  Dispense: 30 Tablet; Refill: 3  - Empagliflozin (JARDIANCE) 25 MG Tab; Take 1 Tablet by mouth every day.  Dispense: 30 Tablet; Refill: 3    2. Hyperlipidemia due to type 2 diabetes mellitus (HCC)  - atorvastatin (LIPITOR) 40 MG Tab; Take 1 Tablet by mouth every day.  Dispense: 30 Tablet; Refill: 3    3. Microalbuminuria due to type 2 diabetes mellitus (HCC)  We will start on Jardiance as above.  Would consider adding lisinopril depending on home blood pressure readings    4. Elevated blood pressure reading  Patient instructed to start home monitoring.  He will send us a [employer] message with his readings over the next 1 to 2 weeks, would consider starting ACE/ARB if needed    5. Polycythemia  Suspect some hemoconcentration related to uncontrolled diabetes and possible mild dehydration but also contribution from likely TIFFANIE.  Could consider repeat labs with next blood work when diabetes is better controlled.    Follow-up: Return in about 3 months (around 11/19/2021), or if symptoms worsen or fail to improve, for hyperlipidemia, diabetes, hypertension.

## 2021-08-20 NOTE — ASSESSMENT & PLAN NOTE
He has not been able to obtain a blood pressure cuff.  At his last visit his blood pressure was mildly elevated 140/90.  He has never been on any medication for blood pressure.  He plans to purchase cuff and start home monitoring.

## 2021-09-09 DIAGNOSIS — E11.9 TYPE 2 DIABETES MELLITUS WITHOUT COMPLICATION, WITHOUT LONG-TERM CURRENT USE OF INSULIN (HCC): ICD-10-CM

## 2021-09-09 DIAGNOSIS — E11.69 HYPERLIPIDEMIA DUE TO TYPE 2 DIABETES MELLITUS (HCC): ICD-10-CM

## 2021-09-09 DIAGNOSIS — E78.5 HYPERLIPIDEMIA DUE TO TYPE 2 DIABETES MELLITUS (HCC): ICD-10-CM

## 2021-09-09 RX ORDER — ATORVASTATIN CALCIUM 40 MG/1
40 TABLET, FILM COATED ORAL DAILY
Qty: 30 TABLET | Refills: 3 | Status: SHIPPED | OUTPATIENT
Start: 2021-09-09

## 2021-09-09 RX ORDER — EMPAGLIFLOZIN 25 MG/1
1 TABLET, FILM COATED ORAL DAILY
Qty: 30 TABLET | Refills: 3 | Status: SHIPPED | OUTPATIENT
Start: 2021-09-09

## 2021-09-09 NOTE — TELEPHONE ENCOUNTER
Phone Number Called: [phone] (home)       Call outcome: Spoke to patient regarding message below.    Message: [facility]